# Patient Record
Sex: FEMALE | URBAN - METROPOLITAN AREA
[De-identification: names, ages, dates, MRNs, and addresses within clinical notes are randomized per-mention and may not be internally consistent; named-entity substitution may affect disease eponyms.]

---

## 2024-06-13 ENCOUNTER — DOCUMENTATION ONLY (OUTPATIENT)
Dept: TRANSPLANT | Facility: CLINIC | Age: 64
End: 2024-06-13

## 2024-06-13 ENCOUNTER — TELEPHONE (OUTPATIENT)
Dept: TRANSPLANT | Facility: CLINIC | Age: 64
End: 2024-06-13

## 2024-06-13 NOTE — TELEPHONE ENCOUNTER
----- Message from Светлана Street MD sent at 6/13/2024  3:00 PM CDT -----  Yes but please add the reason for clearance - adrenal tumor  ----- Message -----  From: Abadie, Michelle, RN  Sent: 6/13/2024   3:00 PM CDT  To: Светлана Street MD    For the letter do I say clearance from Endocrinology?  ----- Message -----  From: Светлана Street MD  Sent: 6/13/2024   2:52 PM CDT  To: Michelle Abadie, RN          He can just come to [facility] when he has clearance from MD mcneill or any expert for his large adrenal tumor  ----- Message -----  From: Abadie, Michelle, RN  Sent: 6/13/2024   2:17 PM CDT  To: Светлана Street MD    Good afternoon Dr Jansen,    We have received a referral on the above patient. She is 64 y/o, BMI of 20, and Predialysis.  Her medical history includes DMII-insulin dependant, HTN, HCV AB +, CAD on Plavix, PAD with total occlusion of L infrapol s/p PTA of RLE, and an adrenal tumor. Her referral form indicates that she is need of an adrenal tumor resection but it has been delayed by MD Mcneill due to unable to do a CT with IV contrast.  A 3.4 cm right adrenal lesion with an SUV of 5.5 inseparable from the right hepatic lobe, and suspicious for primary adrenal malignancy.Previous coordination of resection has been delayed due to her multiple comorbidities including worsening diabetes, decreased renal function, increased white count, and intermittent claudication.    Is patient eligible to come to [facility] at this time?    Thank you,  Caitlin

## 2025-01-07 ENCOUNTER — TELEPHONE (OUTPATIENT)
Dept: TRANSPLANT | Facility: CLINIC | Age: 65
End: 2025-01-07